# Patient Record
(demographics unavailable — no encounter records)

---

## 2024-12-11 NOTE — HISTORY OF PRESENT ILLNESS
[FreeTextEntry1] :  Patient here for periodic assessment and blood work. [de-identified] :  Patient here for periodic assessment and blood work. currently the daughter is stating that the patient has a difficult time swallowing solids for the past 2 weeks.  s/p PPM s/p Left ICA atherectomy

## 2024-12-12 NOTE — HISTORY OF PRESENT ILLNESS
[FreeTextEntry1] : CHASTITY RAY is a 82 y.o. M with medical history significant for DM2, PAD, HLD, Bioprosthetic AVR ((), CVA (2024), carotid atherosclerosis s/p  (10/2024), Dementia, Hypothyroidism, SSS s/p BSci PPM-D (9/3/24).  He is here for follow-up.  He is s/p . no complications.  daughter has noticed that his BP has been running high at home.  He is otherwise doing well since pacemaker placement.  No cardiac complaints at this time.  He has noticed weight loss during the past 3 mo (~10lbs). TFTs noted to be hyper.  He was recently instructed to decrease levothyroxine dose.   Denies CP, SOB, orthopnea, dizziness, syncope, LH, edema  He had shingles in  and since has decreased his exercise capacity. Since recent stroke, he was walking with a walker due to imbalance but has improved and now is walking with a cane. His exercise capacity has been gradually improving since stroke but still very limited.  He is able to walk 1 block and climb 1 flight of stairs. He ambulates with a cane Sleeps with 1 pillow.   Of note,  He was admitted 24-24 for acute stroke ([facility]). He presented for left sided weakness and facial droop. CT head showed no acute ICH. Left carotid bifurcation stenosis 60-70%, chronic infarcts and senescent cerebral changes. MRI head showed right thalamic subacute infarction. Left frontal and left cerebellar remote hemorrhagic infarctions. Multiple remote non-hemorrhagic infarctions within the cerebral hemispheric white matter, left thalamus, left ventral tim and each cerebellar hemispheric. Remote petechial hemorrhages right frontal lobe and right cerebellar hemisphere.  He was advised to have a cardiac monitor vs loop for arrhythmia surveillance.  He has significant neuro recovery since.  He denies history of MI Denies family history of premature ASCVD and /or SCD   DATA LABS (24): TSH 0.01, K 4.9, CRE 0.85, LFT's wnl, eGFR 87, , TC 76, HDL 39, LDL 13, NON-HDL 37, A1C 5.9, HGB 12.9, HCT 40.6. LABS (24): K 5.1, CRE 0.82, LFT's wnl, eGFR 88, , TC 92, HLD 46, LDL 24, NON-HLD 45, A1C 5.5, TSH 0.5, HGB 13.1, HCT 39.2.  ECG (24): NSR at 59 bpm w axis 98, RBBB and no STT abn  ECG (6/10/24): NSR at 66 bpm w nl axis, IRBBB and no STT abn   ECHO (24- [facility]): LV cavity is small. EF 68%. mod LVH. Grade IDD. No IC shunts. Mild MR. There is calcification of the MV.  AV is not well visualized. No AR.   Carotid US (24): <50% DOYLE stenosis. <50% LICA stenosis. Unable to r/o higher degree of stenosis secondary to drop out from plaque.   Ct NST (24): Neg for ischemia. EF 84%.

## 2024-12-12 NOTE — ASSESSMENT
[FreeTextEntry1] : CHASTITY RAY is a 82 y.o. M with medical history significant for CVA, DM2, carotid atherosclerosis, HLD, carotid atherosclerosis, SSS s/p PPM, Dementia and Hypothyroidism. He is here for follow-up. He has no cardiac complaints at this time. BP has been uncontrolled. NST is negative for ischemia. He is s/p successful  to the LICA stenosis.   - I reviewed labs --> LDL=13, TFTs hyperthyroid.  - decrease atorvastatin to 20 mg daily.  - add HCTZ 12.5 mg daily.  - continue taking other cardiac meds - monitor TFTs. Levothyroxine recently decreased.   - he goes to pacemaker clinic  - he will monitor his BP at home and bring a BP log to next visit  - Increase ambulation as tolerated to aim for approximately 30 minutes of moderate activity 5 days a week.  Heart healthy diet low in sodium, sugars and saturated fats and high in fruits, vegetables and whole grains.   Patient advised to go to the nearest ED whenever any symptoms persist and/or worsens.  Patient/Family/Caregiver verbalized complete understanding of these instructions.  I spent a total of 25 minutes with more than 50% spent on counseling and coordinating care.  RTO in 3 mo.

## 2024-12-12 NOTE — PHYSICAL EXAM
[Well Developed] : well developed [Well Nourished] : well nourished [No Acute Distress] : no acute distress [Normal Venous Pressure] : normal venous pressure [No Carotid Bruit] : no carotid bruit [Normal S1, S2] : normal S1, S2 [No Murmur] : no murmur [No Rub] : no rub [Clear Lung Fields] : clear lung fields [Good Air Entry] : good air entry [No Respiratory Distress] : no respiratory distress  [No Edema] : no edema [No Cyanosis] : no cyanosis [No Clubbing] : no clubbing [Moves all extremities] : moves all extremities [No Focal Deficits] : no focal deficits [Normal Speech] : normal speech [Alert and Oriented] : alert and oriented [Normal memory] : normal memory

## 2024-12-12 NOTE — REVIEW OF SYSTEMS
[Fever] : no fever [Headache] : no headache [Chills] : no chills [Feeling Fatigued] : not feeling fatigued [Blurry Vision] : no blurred vision [Seeing Double (Diplopia)] : no diplopia [SOB] : no shortness of breath [Dyspnea on exertion] : not dyspnea during exertion [Chest Discomfort] : no chest discomfort [Lower Ext Edema] : no extremity edema [Leg Claudication] : no intermittent leg claudication [Palpitations] : no palpitations [Orthopnea] : no orthopnea [PND] : no PND [Syncope] : no syncope [Cough] : no cough [Wheezing] : no wheezing [Coughing Up Blood] : no hemoptysis [Dizziness] : no dizziness [Tremor] : no tremor was seen [Numbness (Hypoesthesia)] : no numbness [Convulsions] : no convulsions [Tingling (Paresthesia)] : no tingling [Weakness] : no weakness [Limb Weakness (Paresis)] : no limb weakness (Paresis) [Speech Disturbance] : no speech disturbance [Confusion] : no confusion was observed [Memory Lapses Or Loss] : no memory lapses or loss [Depression] : no depression [Anxiety] : no anxiety [Under Stress] : not under stress [Suicidal] : not suicidal [Easy Bleeding] : no tendency for easy bleeding [Swollen Glands] : no swollen glands [Easy Bruising] : no tendency for easy bruising

## 2025-01-27 NOTE — ASSESSMENT
[FreeTextEntry1] : CHASTITY RAY is an 82 y.o. M with medical history significant for CVA, DM2, carotid atherosclerosis s/p successful  to the LICA (), HLD, SSS s/p PPM, Dementia and Hypothyroidism. He is here for follow-up. He has no cardiac complaints at this time. BP is much better controlled on current regimen. NST <1yr ago is negative for ischemia. He has normal LV function. He is requesting to have endoscopy/colonoscopy for c/o abdominal and diarrhea.    - ECG performed and reviewed today in office. - he is stable from cardiac standpoint to have low risk procedure. There is no cardiac contraindication to having planned procedure. He may proceed to have procedure. If indicated, he may hold ASA 5 days prior to procedure. No indication for prophylactic antibiotics. He is not pacemaker dependent.  - continue taking cardiac meds (HCTZ 12.5 mg, Atorvastatin 20 mg, ASA 81 mg) - monitor TFTs. Levothyroxine recently decreased.   - he goes to pacemaker clinic  - he will monitor his BP at home and bring a BP log to next visit  - Increase ambulation as tolerated to aim for approximately 30 minutes of moderate activity 5 days a week.  Heart healthy diet low in sodium, sugars and saturated fats and high in fruits, vegetables and whole grains.   Patient advised to go to the nearest ED whenever any symptoms persist and/or worsens.  Patient/Family/Caregiver verbalized complete understanding of these instructions.  I spent a total of 25 minutes with more than 50% spent on counseling and coordinating care.   RTO in 3 mo.

## 2025-01-27 NOTE — HISTORY OF PRESENT ILLNESS
[Aortic Stenosis] : no aortic stenosis [Atrial Fibrillation] : no atrial fibrillation [Coronary Artery Disease] : coronary artery disease [Recent Myocardial Infarction] : no recent myocardial infarction [Implantable Device/Pacemaker] : implantable device/pacemaker [Asthma] : no asthma [COPD] : no COPD [Sleep Apnea] : no sleep apnea [Smoker] : not a smoker [No Adverse Anesthesia Reaction] : no adverse anesthesia reaction in self or family member [Chronic Anticoagulation] : no chronic anticoagulation [Chronic Kidney Disease] : no chronic kidney disease [Diabetes] : diabetes [(Patient denies any chest pain, claudication, dyspnea on exertion, orthopnea, palpitations or syncope)] : Patient denies any chest pain, claudication, dyspnea on exertion, orthopnea, palpitations or syncope [Moderate (4-6 METs)] : Moderate (4-6 METs) [Anti-Platelet Agents: _____] : Anti-Platelet Agents: [unfilled] [FreeTextEntry1] : EGD/colonoscopy [FreeTextEntry3] : Dr. Upton [FreeTextEntry4] :  patient here for pre-op clearance [Family Member] : family member

## 2025-01-27 NOTE — HISTORY OF PRESENT ILLNESS
[FreeTextEntry1] : CHASTITY RAY is a 82 y.o. M with medical history significant for DM2, PAD, HLD, Bioprosthetic AVR ((), CVA (2024), carotid atherosclerosis s/p  (10/2024), Dementia, Hypothyroidism, SSS s/p BSci PPM-D (9/3/24).  He is here for follow-up.  He is s/p . no complications.  BP is well controlled on current regimen.  He is overall doing well since pacemaker placement.  He is requesting clearance to have colonoscopy/endoscopy.  No cardiac complaints at this time.  He has noticed weight loss during the past 3 mo (~10lbs). TFTs noted to be hyper.  He was recently instructed to decrease levothyroxine dose.   Denies CP, SOB, orthopnea, dizziness, syncope, LH, edema  He had shingles in  and since has decreased his exercise capacity. Since recent stroke, he was walking with a walker due to imbalance but has improved and now is walking with a cane. His exercise capacity has been gradually improving since stroke but still very limited.  He is able to walk 1 block and climb 1 flight of stairs. He ambulates with a cane Sleeps with 1 pillow.   Of note,  He was admitted 24-24 for acute stroke ([facility]). He presented for left sided weakness and facial droop. CT head showed no acute ICH. Left carotid bifurcation stenosis 60-70%, chronic infarcts and senescent cerebral changes. MRI head showed right thalamic subacute infarction. Left frontal and left cerebellar remote hemorrhagic infarctions. Multiple remote non-hemorrhagic infarctions within the cerebral hemispheric white matter, left thalamus, left ventral tim and each cerebellar hemispheric. Remote petechial hemorrhages right frontal lobe and right cerebellar hemisphere.  He was advised to have a cardiac monitor vs loop for arrhythmia surveillance.  He has significant neuro recovery since.  He denies history of MI Denies family history of premature ASCVD and /or SCD   DATA LABS (24): TSH 0.01, K 4.9, CRE 0.85, LFT's wnl, eGFR 87, , TC 76, HDL 39, LDL 13, NON-HDL 37, A1C 5.9, HGB 12.9, HCT 40.6. LABS (24): K 5.1, CRE 0.82, LFT's wnl, eGFR 88, , TC 92, HLD 46, LDL 24, NON-HLD 45, A1C 5.5, TSH 0.5, HGB 13.1, HCT 39.2.  ECG (25): NSR at 68 bpm w axis 138, RBBB, inferior Qs ECG (24): NSR at 59 bpm w axis 98, RBBB and no STT abn  ECG (6/10/24): NSR at 66 bpm w nl axis, IRBBB and no STT abn   ECHO (24- [facility]): LV cavity is small. EF 68%. mod LVH. Grade IDD. No IC shunts. Mild MR. There is calcification of the MV.  AV is not well visualized. No AR.   Carotid US (24): <50% DOYLE stenosis. <50% LICA stenosis. Unable to r/o higher degree of stenosis secondary to drop out from plaque.   Ct NST (24): Neg for ischemia. EF 84%.

## 2025-03-07 NOTE — ADDENDUM
[FreeTextEntry1] : I, Sohail Bautista, am scribing for and the presence of Dr. Pascual the following sections: HPI, PMH,Family/social history, ROS, Physical Exam, Assessment / Plan.

## 2025-03-07 NOTE — HISTORY OF PRESENT ILLNESS
[FreeTextEntry1] : Mr. Tim is a pleasant 83 year-old male with DM2, PAD, HLD, Bioprosthetic AVR ((2011- [facility]), carotid atherosclerosis, Dementia, Hypothyroidism, CVA (5/2024), and sinus node dysfunction s/p BSci PPM-D (9/3/2024), who presents for follow up.  He underwent ambulatory telemetry.  He had two pause episodes, longest 4.5 seconds at 0723.    Now s/p pacemaker.  He is doing well.  No device related complaints.  No syncope, near syncope, SOB or palpitations.

## 2025-03-07 NOTE — CARDIOLOGY SUMMARY
[de-identified] : SB @ 59 bpm, RBBB, JACQUI 192 MS  [de-identified] : Stress test 8/2024 Normal, no evidence of ischemia, no WMA [de-identified] : PATO 8/16/24  1. Normal left ventricular systolic function.  2. Normal right ventricular systolic function.  3. No LA/RA/KISHORE/RAA thrombus seen.  4. No evidence of an intracardiac shunt.  5. Bioprosthetic valve is seen in the aortic position with apparent normal function, without evidence of prosthetic dysfunction.  6. Mild mitral regurgitation.  7. No pericardial effusion.  8. Severe plaque seen in the visualized portion of the descending aorta.  5/23/24 TTE 1. Left ventricular cavity is small. Left ventricular wall thickness is normal. Left ventricular systolic function is normal with an ejection fraction of 68 % by [place]'s method of disks. There are no regional wall motion abnormalities seen.  2. There is mild (grade 1) left ventricular diastolic dysfunction.  3. Normal right ventricular cavity size, with normal wall thickness, and reduced systolic function. Tricuspid annular plane systolic excursion (TAPSE) is 1.2 cm (normal >=1.7 cm). Tricuspid annular tissue Doppler S' is 9.0 cm/s (normal >10 cm/s).  4. Mild mitral regurgitation.  5. Aortic valve was not well visualized.  6. Normal left and right atrial size.  7. Pulmonary artery systolic pressure could not be estimated.  8. Agitated saline injection was negative for intracardiac shunt.  9. Moderate left ventricular hypertrophy. 10. There is calcification of the mitral valve annulus. 11. There is increased LV mass and concentric hypertrophy. 12. No pericardial effusion seen. 13. No prior echocardiogram is available for comparison.

## 2025-03-07 NOTE — PROCEDURE
[No] : not [NSR] : normal sinus rhythm [Pacemaker] : pacemaker [DDD] : DDD [de-identified] :  [de-identified] : 15 years  [de-identified] : see paceart 2/5/25 AP 9%  <1% no events

## 2025-03-07 NOTE — CARDIOLOGY SUMMARY
[de-identified] : SB @ 59 bpm, RBBB, JACQUI 192 MS  [de-identified] : Stress test 8/2024 Normal, no evidence of ischemia, no WMA [de-identified] : PATO 8/16/24  1. Normal left ventricular systolic function.  2. Normal right ventricular systolic function.  3. No LA/RA/KISHORE/RAA thrombus seen.  4. No evidence of an intracardiac shunt.  5. Bioprosthetic valve is seen in the aortic position with apparent normal function, without evidence of prosthetic dysfunction.  6. Mild mitral regurgitation.  7. No pericardial effusion.  8. Severe plaque seen in the visualized portion of the descending aorta.  5/23/24 TTE 1. Left ventricular cavity is small. Left ventricular wall thickness is normal. Left ventricular systolic function is normal with an ejection fraction of 68 % by [place]'s method of disks. There are no regional wall motion abnormalities seen.  2. There is mild (grade 1) left ventricular diastolic dysfunction.  3. Normal right ventricular cavity size, with normal wall thickness, and reduced systolic function. Tricuspid annular plane systolic excursion (TAPSE) is 1.2 cm (normal >=1.7 cm). Tricuspid annular tissue Doppler S' is 9.0 cm/s (normal >10 cm/s).  4. Mild mitral regurgitation.  5. Aortic valve was not well visualized.  6. Normal left and right atrial size.  7. Pulmonary artery systolic pressure could not be estimated.  8. Agitated saline injection was negative for intracardiac shunt.  9. Moderate left ventricular hypertrophy. 10. There is calcification of the mitral valve annulus. 11. There is increased LV mass and concentric hypertrophy. 12. No pericardial effusion seen. 13. No prior echocardiogram is available for comparison.

## 2025-03-07 NOTE — PHYSICAL EXAM
[Well Developed] : well developed [Well Nourished] : well nourished [No Acute Distress] : no acute distress [Normal Conjunctiva] : normal conjunctiva [Normal Venous Pressure] : normal venous pressure [No Carotid Bruit] : no carotid bruit [Normal S1, S2] : normal S1, S2 [No Murmur] : no murmur [No Rub] : no rub [No Gallop] : no gallop [Clear Lung Fields] : clear lung fields [Good Air Entry] : good air entry [No Respiratory Distress] : no respiratory distress  [Soft] : abdomen soft [Non Tender] : non-tender [No Masses/organomegaly] : no masses/organomegaly [Normal Bowel Sounds] : normal bowel sounds [No Edema] : no edema [No Cyanosis] : no cyanosis [No Clubbing] : no clubbing [No Varicosities] : no varicosities [No Rash] : no rash [No Skin Lesions] : no skin lesions [Moves all extremities] : moves all extremities [No Focal Deficits] : no focal deficits [Normal Speech] : normal speech [Alert and Oriented] : alert and oriented [Normal memory] : normal memory [General Appearance - Well Developed] : well developed [Normal Appearance] : normal appearance [Well Groomed] : well groomed [General Appearance - Well Nourished] : well nourished [No Deformities] : no deformities [General Appearance - In No Acute Distress] : no acute distress [Left Infraclavicular] : left infraclavicular area [Clean] : clean [Dry] : dry [Well-Healed] : well-healed [Palpable Crepitus] : no palpable crepitus [Bleeding] : no active bleeding [Foul Odor] : no foul smell [Purulent Drainage] : no purulent drainage [Serosanguineous Drainage] : no serosanquineous drainage [Serous Drainage] : no serous drainage [Erythema] : not erythematous [Warm] : not warm [Tender] : not tender [Indurated] : not indurated [Fluctuant] : not fluctuant

## 2025-03-07 NOTE — PROCEDURE
[No] : not [NSR] : normal sinus rhythm [Pacemaker] : pacemaker [DDD] : DDD [de-identified] :  [de-identified] : 15 years  [de-identified] : see paceart 2/5/25 AP 9%  <1% no events

## 2025-03-07 NOTE — DISCUSSION/SUMMARY
[Pacemaker Function Normal] : normal pacemaker function [FreeTextEntry1] : Mr. Tim is a pleasant 83 year-old male with DM2, PAD, HLD, Bioprosthetic AVR ((2011- [facility]), carotid atherosclerosis, Dementia, Hypothyroidism, CVA (5/2024), and sinus node dysfunction s/p BSci PPM-D (9/3/2024), who presents for follow up.  Device incision is well healed.  Interrogation reveals normal function and all measured data is within normal limits.  No events for review and no changes made today.  Follow up in 6 months or sooner if needed.  He knows to call with any questions or concerns.

## 2025-03-07 NOTE — REVIEW OF SYSTEMS
[Feeling Fatigued] : feeling fatigued [Negative] : Heme/Lymph [Fever] : no fever [Chills] : no chills [Dyspnea on exertion] : not dyspnea during exertion [Chest Discomfort] : no chest discomfort [Palpitations] : no palpitations [Syncope] : no syncope

## 2025-03-26 NOTE — HISTORY OF PRESENT ILLNESS
[FreeTextEntry1] :  Patient here for periodic assessment and blood work. [de-identified] :  Patient here for periodic assessment and blood work.